# Patient Record
Sex: MALE | Race: AMERICAN INDIAN OR ALASKA NATIVE
[De-identification: names, ages, dates, MRNs, and addresses within clinical notes are randomized per-mention and may not be internally consistent; named-entity substitution may affect disease eponyms.]

---

## 2017-08-13 ENCOUNTER — HOSPITAL ENCOUNTER (INPATIENT)
Dept: HOSPITAL 5 - ED | Age: 32
LOS: 2 days | Discharge: HOME | DRG: 872 | End: 2017-08-15
Attending: INTERNAL MEDICINE | Admitting: INTERNAL MEDICINE
Payer: SELF-PAY

## 2017-08-13 DIAGNOSIS — R73.9: ICD-10-CM

## 2017-08-13 DIAGNOSIS — K52.9: ICD-10-CM

## 2017-08-13 DIAGNOSIS — A41.9: Primary | ICD-10-CM

## 2017-08-13 DIAGNOSIS — Z23: ICD-10-CM

## 2017-08-13 DIAGNOSIS — F17.210: ICD-10-CM

## 2017-08-13 LAB
ALBUMIN SERPL-MCNC: 4.4 G/DL (ref 3.9–5)
ALBUMIN/GLOB SERPL: 1.3 %
ALP SERPL-CCNC: 47 UNITS/L (ref 35–129)
ALT SERPL-CCNC: 18 UNITS/L (ref 7–56)
ANION GAP SERPL CALC-SCNC: 23 MMOL/L
BASOPHILS NFR BLD AUTO: 0.2 % (ref 0–1.8)
BILIRUB DIRECT SERPL-MCNC: 0.2 MG/DL (ref 0–0.2)
BILIRUB INDIRECT SERPL-MCNC: 0.7 MG/DL
BILIRUB SERPL-MCNC: 0.9 MG/DL (ref 0.1–1.2)
BILIRUB UR QL STRIP: (no result)
BLOOD UR QL VISUAL: (no result)
BUN SERPL-MCNC: 15 MG/DL (ref 9–20)
BUN/CREAT SERPL: 12.5 %
CALCIUM SERPL-MCNC: 9.6 MG/DL (ref 8.4–10.2)
CHLORIDE SERPL-SCNC: 90.5 MMOL/L (ref 98–107)
CO2 SERPL-SCNC: 21 MMOL/L (ref 22–30)
EOSINOPHIL NFR BLD AUTO: 0.2 % (ref 0–4.3)
GLUCOSE SERPL-MCNC: 209 MG/DL (ref 75–100)
HCT VFR BLD CALC: 44.3 % (ref 35.5–45.6)
HGB BLD-MCNC: 15.2 GM/DL (ref 11.8–15.2)
INR PPP: 1.03 (ref 0.87–1.13)
KETONES UR STRIP-MCNC: (no result) MG/DL
LEUKOCYTE ESTERASE UR QL STRIP: (no result)
LIPASE SERPL-CCNC: 26 UNITS/L (ref 13–60)
MCH RBC QN AUTO: 31 PG (ref 28–32)
MCHC RBC AUTO-ENTMCNC: 34 % (ref 32–34)
MCV RBC AUTO: 89 FL (ref 84–94)
MUCOUS THREADS #/AREA URNS HPF: (no result) /HPF
NITRITE UR QL STRIP: (no result)
PH UR STRIP: 5 [PH] (ref 5–7)
PLATELET # BLD: 256 K/MM3 (ref 140–440)
POTASSIUM SERPL-SCNC: 3.5 MMOL/L (ref 3.6–5)
PROT SERPL-MCNC: 7.9 G/DL (ref 6.3–8.2)
PROT UR STRIP-MCNC: (no result) MG/DL
RBC # BLD AUTO: 4.98 M/MM3 (ref 3.65–5.03)
RBC #/AREA URNS HPF: 2 /HPF (ref 0–6)
SODIUM SERPL-SCNC: 131 MMOL/L (ref 137–145)
UROBILINOGEN UR-MCNC: < 2 MG/DL (ref ?–2)
WBC # BLD AUTO: 13.6 K/MM3 (ref 4.5–11)
WBC #/AREA URNS HPF: 1 /HPF (ref 0–6)

## 2017-08-13 PROCEDURE — 80048 BASIC METABOLIC PNL TOTAL CA: CPT

## 2017-08-13 PROCEDURE — 85610 PROTHROMBIN TIME: CPT

## 2017-08-13 PROCEDURE — 81001 URINALYSIS AUTO W/SCOPE: CPT

## 2017-08-13 PROCEDURE — 85007 BL SMEAR W/DIFF WBC COUNT: CPT

## 2017-08-13 PROCEDURE — 85025 COMPLETE CBC W/AUTO DIFF WBC: CPT

## 2017-08-13 PROCEDURE — 74177 CT ABD & PELVIS W/CONTRAST: CPT

## 2017-08-13 PROCEDURE — 96366 THER/PROPH/DIAG IV INF ADDON: CPT

## 2017-08-13 PROCEDURE — 87045 FECES CULTURE AEROBIC BACT: CPT

## 2017-08-13 PROCEDURE — 87493 C DIFF AMPLIFIED PROBE: CPT

## 2017-08-13 PROCEDURE — 82962 GLUCOSE BLOOD TEST: CPT

## 2017-08-13 PROCEDURE — 82150 ASSAY OF AMYLASE: CPT

## 2017-08-13 PROCEDURE — 83690 ASSAY OF LIPASE: CPT

## 2017-08-13 PROCEDURE — 82140 ASSAY OF AMMONIA: CPT

## 2017-08-13 PROCEDURE — 80074 ACUTE HEPATITIS PANEL: CPT

## 2017-08-13 PROCEDURE — 82803 BLOOD GASES ANY COMBINATION: CPT

## 2017-08-13 PROCEDURE — 82550 ASSAY OF CK (CPK): CPT

## 2017-08-13 PROCEDURE — 82805 BLOOD GASES W/O2 SATURATION: CPT

## 2017-08-13 PROCEDURE — 86901 BLOOD TYPING SEROLOGIC RH(D): CPT

## 2017-08-13 PROCEDURE — 36415 COLL VENOUS BLD VENIPUNCTURE: CPT

## 2017-08-13 PROCEDURE — 86900 BLOOD TYPING SEROLOGIC ABO: CPT

## 2017-08-13 PROCEDURE — 96365 THER/PROPH/DIAG IV INF INIT: CPT

## 2017-08-13 PROCEDURE — 74176 CT ABD & PELVIS W/O CONTRAST: CPT

## 2017-08-13 PROCEDURE — 36600 WITHDRAWAL OF ARTERIAL BLOOD: CPT

## 2017-08-13 PROCEDURE — 86850 RBC ANTIBODY SCREEN: CPT

## 2017-08-13 PROCEDURE — 99406 BEHAV CHNG SMOKING 3-10 MIN: CPT

## 2017-08-13 PROCEDURE — 96375 TX/PRO/DX INJ NEW DRUG ADDON: CPT

## 2017-08-13 PROCEDURE — 83036 HEMOGLOBIN GLYCOSYLATED A1C: CPT

## 2017-08-13 PROCEDURE — 87040 BLOOD CULTURE FOR BACTERIA: CPT

## 2017-08-13 NOTE — CAT SCAN REPORT
FINAL REPORT



EXAM:  CT ABDOMEN PELVIS W CON



HISTORY:  n,v,fever 



TECHNIQUE:  Serial axial images through the abdomen and pelvis

with coronal and sagittal reconstruction. Intravenous

administration of 100 milliliters Omnipaque 300



PRIORS:  None.



FINDINGS:  

 There is atelectasis in the lung bases. No pleural effusion is

seen. 



The liver, gallbladder, pancreas, spleen and adrenal glands

appear within normal limits. 



Kidneys appear normal. 



Aorta is normal in caliber. 



Bladder is decompressed. 



No free fluid. 



There is not evidence of bowel obstruction. Scattered diverticula

are seen arising from the colon. The appendix is not identified

with certainty. There is diffuse colonic wall thickening with

hyper enhancing mucosa. 



Prominent mesenteric lymph nodes are noted. 



No acute osseous abnormality is identified. 



IMPRESSION:  

1. There is diffuse wall thickening seen associated with the

colon with hyper enhancing mucosa. This is consistent with

colitis.

2. Diverticulosis. 

3. Prominent mesenteric lymph nodes are noted. These may be

reactive. 

4. No free fluid is seen in the abdomen or pelvis.

## 2017-08-13 NOTE — EMERGENCY DEPARTMENT REPORT
ED Fever HPI





- General


Chief Complaint: Fever


Stated Complaint: FEVER/ABD PAIN/VOMITING


Time Seen by Provider: 08/13/17 17:21


Source: patient


Exam Limitations: no limitations





- History of Present Illness


Initial Comments: 





32-year-old male no significant past medical or surgical history presents to 

the hospital complaints nausea, vomiting, diarrhea, and fever 2 days.  Patient 

is able to tolerate liquids but not food.  Denies recent travel, sick contacts, 

dysuria, or cough symptoms.  Mild sore throat several days ago has since 

resolved.  Patient complains of generalized mild to moderate lower back pain 

that is aching.





ED Review of Systems


ROS: 


Stated complaint: FEVER/ABD PAIN/VOMITING


Other details as noted in HPI





Comment: All other systems reviewed and negative


Other: 





Constitutional: as Per HPI


Eyes: No eye pain visual changes or discharge


ENT: No ear pain 


Neck: Denies pain


Respiratory: Denies cough wheezing shortness of breath 


Cardiovascular: Denies chest pain, palpitations, syncope


GI: As per HPI


: Denies dysuria, urinary frequency, or urgency


Musculoskeletal: As per HPI


Skin: Denies rash, lesions, erythema


Neurologic: Denies headache, numbness, weakness


Psychiatric: Denies suicidal ideation, hallucinations








ED Past Medical Hx





- Past Medical History


Previous Medical History?: No





- Surgical History


Past Surgical History?: No





- Social History


Smoking Status: Current Some Day Smoker


Substance Use Type: Alcohol





ED Physical Exam





- General


Limitations: No Limitations





- Other


Other exam information: 





General: No limitations, patient is alert in no acute distress


Head exam: Atraumatic, normocephalic


Eyes exam: Normal appearance, pupils equal reactive to light, extraocular 

movements intact


ENT: Dry mucous membranes, no posterior pharyngeal exudates


Neck exam: Normal inspection, full range of motion, no meningismus nontender


Respiratory exam: Clear to auscultation bilateral, no wheezes, rales, crackles


Cardiovascular: Tachycardic normal rhythm


Abdomen: Soft, nondistended, and  nontender, with normal bowel sounds, no 

rebound, or guarding


Extremity: Full range of motion normal inspection no deformity


Back: Normal Inspection, full range of motion, no tenderness


Neurologic: Alert, oriented x3, cranial nerves intact, no motor or sensory 

deficit


Psychiatric: normal affect, normal mood


Skin: Warm, dry, intact





ED Course


 Vital Signs











  08/13/17 08/13/17 08/13/17





  16:39 17:03 17:10


 


Temperature 103.2 F H  


 


Pulse Rate 125 H 114 H 118 H


 


Respiratory 20 18 18





Rate   


 


Blood Pressure 101/55  106/55


 


O2 Sat by Pulse 100 98 99





Oximetry   














  08/13/17 08/13/17 08/13/17





  17:22 17:30 17:37


 


Temperature   


 


Pulse Rate 115 H 109 H 


 


Respiratory 14 11 L 18





Rate   


 


Blood Pressure 105/48 111/61 


 


O2 Sat by Pulse 99 98 100





Oximetry   














  08/13/17





  19:12


 


Temperature 


 


Pulse Rate 


 


Respiratory 18





Rate 


 


Blood Pressure 


 


O2 Sat by Pulse 





Oximetry 














- Reevaluation(s)


Reevaluation #1: 





08/13/17 20:59


Pt received Toradol, Tylenol, and normal saline and continues to have elevated 

heart rate.  Blood pressure now trending downward despite treatment.  Second 

liter of normal saline progress.  Levaquin and Flagyl initially ordered orally 

but now changed to IV.  Plan to admit patient to the hospital for further 

treatment given unstable vital signs





08/13/17 21:39


Patient also continued to have a elevated temperature additional Tylenol 

ordered.  Given persistent fever over pressure and the fact the appendix was 

not identified.  Patient will be admitted.  CT with oral contrast has been 

ordered and pending at disposition





- Consultations


Consultation #1: 





08/13/17 19:56


Case discussed with Dr. Cadena on call GI doctor suggest temporal and Flagyl 

for colitis.





ED Medical Decision Making





- Lab Data


Result diagrams: 


 08/13/17 16:54





 08/13/17 16:54








 Lab Results











  08/13/17 08/13/17 08/13/17 Range/Units





  16:54 16:54 16:54 


 


WBC   13.6 H   (4.5-11.0)  K/mm3


 


RBC   4.98   (3.65-5.03)  M/mm3


 


Hgb   15.2   (11.8-15.2)  gm/dl


 


Hct   44.3   (35.5-45.6)  %


 


MCV   89   (84-94)  fl


 


MCH   31   (28-32)  pg


 


MCHC   34   (32-34)  %


 


RDW   12.7 L   (13.2-15.2)  %


 


Plt Count   256   (140-440)  K/mm3


 


Lymph % (Auto)   5.0 L   (13.4-35.0)  %


 


Mono % (Auto)   5.6   (0.0-7.3)  %


 


Eos % (Auto)   0.2   (0.0-4.3)  %


 


Baso % (Auto)   0.2   (0.0-1.8)  %


 


Lymph #   0.7 L   (1.2-5.4)  K/mm3


 


Mono #   0.8   (0.0-0.8)  K/mm3


 


Eos #   0.0   (0.0-0.4)  K/mm3


 


Baso #   0.0   (0.0-0.1)  K/mm3


 


Seg Neutrophils %   89.0 H   (40.0-70.0)  %


 


Seg Neutrophils #   12.1 H   (1.8-7.7)  K/mm3


 


PT    13.4  (12.2-14.9)  Sec.


 


INR    1.03  (0.87-1.13)  


 


Sodium     (137-145)  mmol/L


 


Potassium     (3.6-5.0)  mmol/L


 


Chloride     ()  mmol/L


 


Carbon Dioxide     (22-30)  mmol/L


 


Anion Gap     mmol/L


 


BUN     (9-20)  mg/dL


 


Creatinine     (0.8-1.5)  mg/dL


 


Estimated GFR     ml/min


 


BUN/Creatinine Ratio     %


 


Glucose     ()  mg/dL


 


POC Glucose     ()  


 


Hemoglobin A1c     (4-6)  %


 


Lactic Acid     (0.7-2.0)  mmol/L


 


Calcium     (8.4-10.2)  mg/dL


 


Total Bilirubin     (0.1-1.2)  mg/dL


 


Direct Bilirubin     (0-0.2)  mg/dL


 


Indirect Bilirubin     mg/dL


 


AST     (5-40)  units/L


 


ALT     (7-56)  units/L


 


Alkaline Phosphatase     ()  units/L


 


Total Creatine Kinase     ()  units/L


 


Total Protein     (6.3-8.2)  g/dL


 


Albumin     (3.9-5)  g/dL


 


Albumin/Globulin Ratio     %


 


Amylase  49    ()  units/L


 


Lipase     (13-60)  units/L


 


Urine Color     (Yellow)  


 


Urine Turbidity     (Clear)  


 


Urine pH     (5.0-7.0)  


 


Ur Specific Gravity     (1.003-1.030)  


 


Urine Protein     (Negative)  mg/dL


 


Urine Glucose (UA)     (Negative)  mg/dL


 


Urine Ketones     (Negative)  mg/dL


 


Urine Blood     (Negative)  


 


Urine Nitrite     (Negative)  


 


Urine Bilirubin     (Negative)  


 


Urine Urobilinogen     (<2.0)  mg/dL


 


Ur Leukocyte Esterase     (Negative)  


 


Urine WBC (Auto)     (0.0-6.0)  /HPF


 


Urine RBC (Auto)     (0.0-6.0)  /HPF


 


Urine Mucus     /HPF














  08/13/17 08/13/17 08/13/17 Range/Units





  16:54 16:54 16:54 


 


WBC     (4.5-11.0)  K/mm3


 


RBC     (3.65-5.03)  M/mm3


 


Hgb     (11.8-15.2)  gm/dl


 


Hct     (35.5-45.6)  %


 


MCV     (84-94)  fl


 


MCH     (28-32)  pg


 


MCHC     (32-34)  %


 


RDW     (13.2-15.2)  %


 


Plt Count     (140-440)  K/mm3


 


Lymph % (Auto)     (13.4-35.0)  %


 


Mono % (Auto)     (0.0-7.3)  %


 


Eos % (Auto)     (0.0-4.3)  %


 


Baso % (Auto)     (0.0-1.8)  %


 


Lymph #     (1.2-5.4)  K/mm3


 


Mono #     (0.0-0.8)  K/mm3


 


Eos #     (0.0-0.4)  K/mm3


 


Baso #     (0.0-0.1)  K/mm3


 


Seg Neutrophils %     (40.0-70.0)  %


 


Seg Neutrophils #     (1.8-7.7)  K/mm3


 


PT     (12.2-14.9)  Sec.


 


INR     (0.87-1.13)  


 


Sodium  131 L    (137-145)  mmol/L


 


Potassium  3.5 L    (3.6-5.0)  mmol/L


 


Chloride  90.5 L    ()  mmol/L


 


Carbon Dioxide  21 L    (22-30)  mmol/L


 


Anion Gap  23    mmol/L


 


BUN  15    (9-20)  mg/dL


 


Creatinine  1.2    (0.8-1.5)  mg/dL


 


Estimated GFR  > 60    ml/min


 


BUN/Creatinine Ratio  12.50    %


 


Glucose  209 H    ()  mg/dL


 


POC Glucose     ()  


 


Hemoglobin A1c     (4-6)  %


 


Lactic Acid   3.70 H*   (0.7-2.0)  mmol/L


 


Calcium  9.6    (8.4-10.2)  mg/dL


 


Total Bilirubin  0.90    (0.1-1.2)  mg/dL


 


Direct Bilirubin  0.2    (0-0.2)  mg/dL


 


Indirect Bilirubin  0.7    mg/dL


 


AST  22    (5-40)  units/L


 


ALT  18    (7-56)  units/L


 


Alkaline Phosphatase  47    ()  units/L


 


Total Creatine Kinase    85  ()  units/L


 


Total Protein  7.9    (6.3-8.2)  g/dL


 


Albumin  4.4    (3.9-5)  g/dL


 


Albumin/Globulin Ratio  1.3    %


 


Amylase     ()  units/L


 


Lipase  26    (13-60)  units/L


 


Urine Color     (Yellow)  


 


Urine Turbidity     (Clear)  


 


Urine pH     (5.0-7.0)  


 


Ur Specific Gravity     (1.003-1.030)  


 


Urine Protein     (Negative)  mg/dL


 


Urine Glucose (UA)     (Negative)  mg/dL


 


Urine Ketones     (Negative)  mg/dL


 


Urine Blood     (Negative)  


 


Urine Nitrite     (Negative)  


 


Urine Bilirubin     (Negative)  


 


Urine Urobilinogen     (<2.0)  mg/dL


 


Ur Leukocyte Esterase     (Negative)  


 


Urine WBC (Auto)     (0.0-6.0)  /HPF


 


Urine RBC (Auto)     (0.0-6.0)  /HPF


 


Urine Mucus     /HPF














  08/13/17 08/13/17 08/13/17 Range/Units





  16:54 19:39 19:57 


 


WBC     (4.5-11.0)  K/mm3


 


RBC     (3.65-5.03)  M/mm3


 


Hgb     (11.8-15.2)  gm/dl


 


Hct     (35.5-45.6)  %


 


MCV     (84-94)  fl


 


MCH     (28-32)  pg


 


MCHC     (32-34)  %


 


RDW     (13.2-15.2)  %


 


Plt Count     (140-440)  K/mm3


 


Lymph % (Auto)     (13.4-35.0)  %


 


Mono % (Auto)     (0.0-7.3)  %


 


Eos % (Auto)     (0.0-4.3)  %


 


Baso % (Auto)     (0.0-1.8)  %


 


Lymph #     (1.2-5.4)  K/mm3


 


Mono #     (0.0-0.8)  K/mm3


 


Eos #     (0.0-0.4)  K/mm3


 


Baso #     (0.0-0.1)  K/mm3


 


Seg Neutrophils %     (40.0-70.0)  %


 


Seg Neutrophils #     (1.8-7.7)  K/mm3


 


PT     (12.2-14.9)  Sec.


 


INR     (0.87-1.13)  


 


Sodium     (137-145)  mmol/L


 


Potassium     (3.6-5.0)  mmol/L


 


Chloride     ()  mmol/L


 


Carbon Dioxide     (22-30)  mmol/L


 


Anion Gap     mmol/L


 


BUN     (9-20)  mg/dL


 


Creatinine     (0.8-1.5)  mg/dL


 


Estimated GFR     ml/min


 


BUN/Creatinine Ratio     %


 


Glucose     ()  mg/dL


 


POC Glucose   168 H   ()  


 


Hemoglobin A1c  6.2 H    (4-6)  %


 


Lactic Acid     (0.7-2.0)  mmol/L


 


Calcium     (8.4-10.2)  mg/dL


 


Total Bilirubin     (0.1-1.2)  mg/dL


 


Direct Bilirubin     (0-0.2)  mg/dL


 


Indirect Bilirubin     mg/dL


 


AST     (5-40)  units/L


 


ALT     (7-56)  units/L


 


Alkaline Phosphatase     ()  units/L


 


Total Creatine Kinase     ()  units/L


 


Total Protein     (6.3-8.2)  g/dL


 


Albumin     (3.9-5)  g/dL


 


Albumin/Globulin Ratio     %


 


Amylase     ()  units/L


 


Lipase     (13-60)  units/L


 


Urine Color    Yellow  (Yellow)  


 


Urine Turbidity    Clear  (Clear)  


 


Urine pH    5.0  (5.0-7.0)  


 


Ur Specific Gravity    1.043 H  (1.003-1.030)  


 


Urine Protein    <15 mg/dl  (Negative)  mg/dL


 


Urine Glucose (UA)    Neg  (Negative)  mg/dL


 


Urine Ketones    Neg  (Negative)  mg/dL


 


Urine Blood    Sm  (Negative)  


 


Urine Nitrite    Neg  (Negative)  


 


Urine Bilirubin    Neg  (Negative)  


 


Urine Urobilinogen    < 2.0  (<2.0)  mg/dL


 


Ur Leukocyte Esterase    Neg  (Negative)  


 


Urine WBC (Auto)    1.0  (0.0-6.0)  /HPF


 


Urine RBC (Auto)    2.0  (0.0-6.0)  /HPF


 


Urine Mucus    Few  /HPF














- Radiology Data


Radiology results: report reviewed





CT of the pelvis IV contrast only diffuse wall thickening seen associate with 

the colon with hyper enhancing mucosa consistent with colitis.  Diverticulosis.

  Prominent mesenteric lymph nodes are noted.  May be reactive.  No free fluid 

is seen in the abdomen and pelvis. appendix not visualized.


CT abdomen and pelvis by mouth contrast: Similar findings.  Still no mention of 

the appendix





- Medical Decision Making





Plans admit patient to the hospital for persistent tachycardia now patient has 

hypotension despite resuscitation and treatment efforts.  Antibiotics, and IV 

fluids initiated.  Choski will be consulted as an inpatient.





- Differential Diagnosis


UTI, gastroenteritis, appendicitis, diverticulitis


Critical Care Time: No


Critical care attestation.: 


If time is entered above; I have spent that time in minutes in the direct care 

of this critically ill patient, excluding procedure time.








ED Disposition


Clinical Impression: 


 Colitis, Hypotension, Fever





Disposition: [id number] OP ADMIT IP TO THIS HOSP


Is pt being admited?: Yes


Condition: Stable


Time of Disposition: 21:00 (hospitalist)

## 2017-08-14 LAB
ANION GAP SERPL CALC-SCNC: 19 MMOL/L
BASE EXCESS STD BLDA CALC-SCNC: -3 MMOL/L
BUN SERPL-MCNC: 14 MG/DL (ref 9–20)
BUN/CREAT SERPL: 17.5 %
CALCIUM SERPL-MCNC: 7.4 MG/DL (ref 8.4–10.2)
CHLORIDE SERPL-SCNC: 103.9 MMOL/L (ref 98–107)
CO2 SERPL-SCNC: 21 MMOL/L (ref 22–30)
CO2 SERPL-SCNC: 23 MMOL/L
GLUCOSE SERPL-MCNC: 135 MG/DL (ref 75–100)
HCO3 BLDV-SCNC: 21.5 MMOL/L
ISTAT DEVICE: 0
ISTAT PH: 7.4 (ref 7.35–7.45)
PCO2 BLDA: 34.8 MM[HG] (ref 35–45)
PO2 BLDCOA: 86 MM[HG] (ref 80–105)
POTASSIUM SERPL-SCNC: 3.5 MMOL/L (ref 3.6–5)
SAO2 % BLDC: 97 %
SODIUM SERPL-SCNC: 140 MMOL/L (ref 137–145)

## 2017-08-14 PROCEDURE — 3E0234Z INTRODUCTION OF SERUM, TOXOID AND VACCINE INTO MUSCLE, PERCUTANEOUS APPROACH: ICD-10-PCS | Performed by: INTERNAL MEDICINE

## 2017-08-14 RX ADMIN — HYDROMORPHONE HYDROCHLORIDE PRN MG: 1 INJECTION, SOLUTION INTRAMUSCULAR; INTRAVENOUS; SUBCUTANEOUS at 10:12

## 2017-08-14 RX ADMIN — METRONIDAZOLE SCH MLS/HR: 5 INJECTION, SOLUTION INTRAVENOUS at 14:52

## 2017-08-14 RX ADMIN — SODIUM CHLORIDE SCH MLS/HR: 0.9 INJECTION, SOLUTION INTRAVENOUS at 18:32

## 2017-08-14 RX ADMIN — SODIUM CHLORIDE SCH MLS/HR: 0.9 INJECTION, SOLUTION INTRAVENOUS at 01:33

## 2017-08-14 RX ADMIN — LEVOFLOXACIN SCH MLS/HR: 5 INJECTION, SOLUTION INTRAVENOUS at 09:38

## 2017-08-14 RX ADMIN — HYDROMORPHONE HYDROCHLORIDE PRN MG: 1 INJECTION, SOLUTION INTRAMUSCULAR; INTRAVENOUS; SUBCUTANEOUS at 22:41

## 2017-08-14 RX ADMIN — HEPARIN SODIUM SCH UNIT: 5000 INJECTION, SOLUTION INTRAVENOUS; SUBCUTANEOUS at 09:39

## 2017-08-14 RX ADMIN — METRONIDAZOLE SCH: 5 INJECTION, SOLUTION INTRAVENOUS at 14:51

## 2017-08-14 RX ADMIN — METRONIDAZOLE SCH MLS/HR: 5 INJECTION, SOLUTION INTRAVENOUS at 22:32

## 2017-08-14 RX ADMIN — SODIUM CHLORIDE SCH MLS/HR: 0.9 INJECTION, SOLUTION INTRAVENOUS at 08:29

## 2017-08-14 NOTE — HISTORY AND PHYSICAL REPORT
CHIEF COMPLAINT:  Fever, other complaints include abdominal pain, nausea, and

vomiting.



HISTORY OF PRESENT ILLNESS:  The patient is a 32-year-old male with no

significant past medical or surgical history presenting to the Emergency Room

with fever, nausea, vomiting, and diarrhea going on for two days.  There is also

history of right and left lower quadrant abdominal pain.  The patient stated

that he was not able to eat, but was able to tolerate some liquid.  There was no

history of recent antibiotic intake or history of recent travel.  The patient

stated he had some diarrhea some days prior to presentation, but said the 
symptoms

were thought to be resolved.  Denies history of low back pain.  There was no

history of dizziness.



PAST MEDICAL HISTORY:  Unremarkable.



PAST SURGICAL HISTORY:  Unremarkable.



FAMILY HISTORY:  Noncontributory.



SOCIAL HISTORY:  The patient drinks alcohol, smokes cigarettes, and does not use

illicit drugs.



MEDICATIONS:  The patient is not on any medication.



ALLERGIES:  There are no known drug allergies.



REVIEW OF SYSTEMS:

CONSTITUTIONAL:  Denies fever, but no chills, no diaphoresis.

HEENT:  There is no headache or sore throat.

CARDIOVASCULAR SYSTEM:  There is no chest pain, orthopnea.

RESPIRATORY SYSTEM:  There is no shortness of breath or cough.

GASTROINTESTINAL SYSTEM:  Abdominal discomfort noted, nausea, vomiting, and

diarrhea noted.

NEUROLOGICAL SYSTEM:  There is no numbness, no dizziness, no altered mental

status.

MUSCULOSKELETAL SYSTEM:  There is no joint pain or swelling.

DERMATOLOGICAL SYSTEM:  There is no skin rash or itching.

GENITOURINARY SYSTEM:  There is no dysuria, hematuria, or flank pain.

Rest of system review is normal.



PHYSICAL EXAMINATION:

GENERAL:  At the time of exam, the patient was found to be alert, oriented x 3,

and not in acute distress.

VITAL SIGNS:  Shows temperature of 98.6 degrees Fahrenheit, pulse of 109,

respirations 11, blood pressure 111/61, and O2 sat of 98% on room air.

HEENT EXAM:  Showed pupils to be equal, round, reactive to light and

accommodation.  Extraocular muscles are intact.

NECK:  Supple with no JVD or carotid bruit.

CARDIOVASCULAR SYSTEM:  Show first and second heart sounds with no gallops or

rubs.

RESPIRATORY SYSTEM:  Showed good air entry on both sides of the lung with no

abnormal breath sounds.

GASTROINTESTINAL SYSTEM:  Show abdomen to be full, soft, and nontender with no

organomegaly or rigidity.

NEUROLOGIC:  Showed no focal deficit.

MUSCULOSKELETAL SYSTEM:  Show no joint swelling or tenderness.

DERMATOLOGICAL SYSTEM:  Show no skin rash.

GENITOURINARY SYSTEM:  Showing no costovertebral angle tenderness.



PERTINENT LABORATORY DATA AND IMAGING STUDIES:  The patient had a CT of the

abdomen with contrast that showed diffuse wall thickening seen associated with

colon along with enhancing mucosa that is consistent with colitis.  There is

also finding of diverticulosis and finding of prominent mesenteric lymph nodes. 

No free fluid was seen in the abdomen or pelvis.  The patient also has no

evidence of bowel obstruction on CT scan.



LABORATORY RESULTS:  The patient had CBC done that shows elevated white count of

13,600 with normal hemoglobin and normal hematocrit as well as normal platelets.

 CBC differential shows elevated segmented neutrophils of 89% and coagulation

test was normal.  Chemistry shows low sodium of 131 with low potassium of 3.5

and low chloride of 90.5 with low CO2 of 21.  The patient's glucose level was

high with a value of 209 with elevated hemoglobin A1c of 6.2.  Lactic acid level

was high with a value of 3.7, repeat lactic acid level came back to normal.  The

patient's urinalysis showed elevated specific gravity of 1.043 with negative

nitrites and negative urine leukocyte esterase and normal urine wbc with no

bacteria seen.



DIAGNOSES:

1.  Colitis.

2.  Hyperglycemia,( elevated blood glucose).



PLAN:  The patient will be admitted to medical floor and will continue

gastroenterology consult with Dr. Surinder Orozco.  The patient will be on IV

normal saline at 150 mL an hour and will be on IV Dilaudid 1 mg every 4 hours as

needed for pain and will be on IV Levaquin 750 mg daily.  The patient will also

be on IV metronidazole 500 mg q.8 hours and will be on IV Zofran 4 mg every six

hours as needed for nausea and vomiting and will be on Tylenol 650 mg by mouth

every 6 hours for fever and headache.  Deep venous thrombosis prophylaxis will

be through sequential compressive devices.  For the management of patient's

colitis will be dependent on the gastroenterology consult.





DD: 08/14/2017 05:24

DT: 08/14/2017 11:19

JOB# [id number]  [id number]

OCN/NTS

MTDD

## 2017-08-14 NOTE — CAT SCAN REPORT
FINAL REPORT



EXAM:  CT ABDOMEN PELVIS WO CON



HISTORY:  persitant fever, abd pain, colitis 



TECHNIQUE:  Serial axial images through the abdomen and pelvis

with coronal and sagittal reconstruction.  Oral contrast was

administered prior to the exam 



PRIORS:  CT abdomen pelvis from 08/13/2017



FINDINGS:  

  There is mild atelectasis in the dependent portion the lung

bases. No pleural effusion is seen. 



The liver, gallbladder, pancreas, spleen and adrenal glands

appear within normal limits. 



No gross abnormality is seen in the kidneys. Contrast media is

seen in the renal collecting systems and bladder. No gross

abnormality is seen in the bladder. 



Aorta is normal in caliber. 



No free fluid. 



There is diffuse wall thickening in the colon, as in the prior

study. There is not evidence of bowel obstruction. Scattered

diverticula are seen arising the colon. 



Mildly prominent mesenteric lymph nodes are noted. 



IMPRESSION:  

1. Abnormal appearance to the colon is again noted, consistent

with diffuse colitis.

2. There is not evidence of bowel obstruction at this time. 

3. No free fluid is seen in the abdomen or pelvis. 

4. Diverticulosis. 

5. Prominent mesenteric lymph nodes are again noted. These may be

reactive.

## 2017-08-14 NOTE — PROGRESS NOTE
Assessment and Plan


Assessment and plan: 





Sepsis.  Continue sepsis pathway.  Patient meets criteria given the fever, 

tachycardia, leukocytosis and diagnosis of colitis.  Follow-up lactic acid 

levels and blood cultures.





Acute colitis.  GI consultation pending.  Continue IV antibiotics.  Follow-up 

stool studies.





History


Interval history: 





Patient complains of intermittent diffuse abdominal pain.  However, pain 

primarily in the lower quadrants.





Hospitalist Physical





- Constitutional


Vitals: 


 











Temp Pulse Resp BP Pulse Ox


 


 98.1 F   98 H  16   92/62   98 


 


 08/14/17 08:00  08/14/17 08:00  08/14/17 08:00  08/14/17 08:00  08/14/17 08:00











General appearance: Present: no acute distress, well-nourished





- EENT


Eyes: Present: PERRL, EOM intact


ENT: hearing intact, clear oral mucosa, dentition normal





- Neck


Neck: Present: supple, normal ROM





- Respiratory


Respiratory effort: normal


Respiratory: bilateral: CTA





- Cardiovascular


Rhythm: regular


Heart Sounds: Present: S1 & S2.  Absent: gallop, rub





- Extremities


Extremities: no ischemia, No edema, Full ROM





- Abdominal


General gastrointestinal: soft, non-tender, tender, non-distended, normal bowel 

sounds


Localized gastrointestinal: tender: RLQ (mild), LLQ (mild)





- Integumentary


Integumentary: Present: clear, warm, dry





- Neurologic


Neurologic: CNII-XII intact, moves all extremities





Results





- Labs


CBC & Chem 7: 


 08/13/17 16:54





 08/14/17 07:12


Labs: 


 Laboratory Last Values











WBC  13.6 K/mm3 (4.5-11.0)  H  08/13/17  16:54    


 


RBC  4.98 M/mm3 (3.65-5.03)   08/13/17  16:54    


 


Hgb  15.2 gm/dl (11.8-15.2)   08/13/17  16:54    


 


Hct  44.3 % (35.5-45.6)   08/13/17  16:54    


 


MCV  89 fl (84-94)   08/13/17  16:54    


 


MCH  31 pg (28-32)   08/13/17  16:54    


 


MCHC  34 % (32-34)   08/13/17  16:54    


 


RDW  12.7 % (13.2-15.2)  L  08/13/17  16:54    


 


Plt Count  256 K/mm3 (140-440)   08/13/17  16:54    


 


Lymph % (Auto)  5.0 % (13.4-35.0)  L  08/13/17  16:54    


 


Mono % (Auto)  5.6 % (0.0-7.3)   08/13/17  16:54    


 


Eos % (Auto)  0.2 % (0.0-4.3)   08/13/17  16:54    


 


Baso % (Auto)  0.2 % (0.0-1.8)   08/13/17  16:54    


 


Lymph #  0.7 K/mm3 (1.2-5.4)  L  08/13/17  16:54    


 


Mono #  0.8 K/mm3 (0.0-0.8)   08/13/17  16:54    


 


Eos #  0.0 K/mm3 (0.0-0.4)   08/13/17  16:54    


 


Baso #  0.0 K/mm3 (0.0-0.1)   08/13/17  16:54    


 


Seg Neutrophils %  89.0 % (40.0-70.0)  H  08/13/17  16:54    


 


Seg Neutrophils #  12.1 K/mm3 (1.8-7.7)  H  08/13/17  16:54    


 


PT  13.4 Sec. (12.2-14.9)   08/13/17  16:54    


 


INR  1.03  (0.87-1.13)   08/13/17  16:54    


 


VBG pH  7.426  (7.320-7.420)  H  08/13/17  21:00    


 


Sodium  140 mmol/L (137-145)  D 08/14/17  07:12    


 


Potassium  3.5 mmol/L (3.6-5.0)  L  08/14/17  07:12    


 


Chloride  103.9 mmol/L ()   08/14/17  07:12    


 


Carbon Dioxide  21 mmol/L (22-30)  L  08/14/17  07:12    


 


Anion Gap  19 mmol/L  08/14/17  07:12    


 


BUN  14 mg/dL (9-20)   08/14/17  07:12    


 


Creatinine  0.8 mg/dL (0.8-1.5)   08/14/17  07:12    


 


Estimated GFR  > 60 ml/min  08/14/17  07:12    


 


BUN/Creatinine Ratio  17.50 %  08/14/17  07:12    


 


Glucose  135 mg/dL ()  H  08/14/17  07:12    


 


POC Glucose  168  ()  H  08/13/17  19:39    


 


Hemoglobin A1c  6.2 % (4-6)  H  08/13/17  16:54    


 


Lactic Acid  1.60 mmol/L (0.7-2.0)   08/13/17  21:00    


 


Calcium  7.4 mg/dL (8.4-10.2)  L D 08/14/17  07:12    


 


Total Bilirubin  0.90 mg/dL (0.1-1.2)   08/13/17  16:54    


 


Direct Bilirubin  0.2 mg/dL (0-0.2)   08/13/17  16:54    


 


Indirect Bilirubin  0.7 mg/dL  08/13/17  16:54    


 


AST  22 units/L (5-40)   08/13/17  16:54    


 


ALT  18 units/L (7-56)   08/13/17  16:54    


 


Alkaline Phosphatase  47 units/L ()   08/13/17  16:54    


 


Total Creatine Kinase  85 units/L ()   08/13/17  16:54    


 


Total Protein  7.9 g/dL (6.3-8.2)   08/13/17  16:54    


 


Albumin  4.4 g/dL (3.9-5)   08/13/17  16:54    


 


Albumin/Globulin Ratio  1.3 %  08/13/17  16:54    


 


Amylase  49 units/L ()   08/13/17  16:54    


 


Lipase  26 units/L (13-60)   08/13/17  16:54    


 


Urine Color  Yellow  (Yellow)   08/13/17  19:57    


 


Urine Turbidity  Clear  (Clear)   08/13/17  19:57    


 


Urine pH  5.0  (5.0-7.0)   08/13/17  19:57    


 


Ur Specific Gravity  1.043  (1.003-1.030)  H  08/13/17  19:57    


 


Urine Protein  <15 mg/dl mg/dL (Negative)   08/13/17  19:57    


 


Urine Glucose (UA)  Neg mg/dL (Negative)   08/13/17  19:57    


 


Urine Ketones  Neg mg/dL (Negative)   08/13/17  19:57    


 


Urine Blood  Sm  (Negative)   08/13/17  19:57    


 


Urine Nitrite  Neg  (Negative)   08/13/17  19:57    


 


Urine Bilirubin  Neg  (Negative)   08/13/17  19:57    


 


Urine Urobilinogen  < 2.0 mg/dL (<2.0)   08/13/17  19:57    


 


Ur Leukocyte Esterase  Neg  (Negative)   08/13/17  19:57    


 


Urine WBC (Auto)  1.0 /HPF (0.0-6.0)   08/13/17  19:57    


 


Urine RBC (Auto)  2.0 /HPF (0.0-6.0)   08/13/17  19:57    


 


Urine Mucus  Few /HPF  08/13/17  19:57

## 2017-08-14 NOTE — GASTROENTEROLOGY CONSULTATION
<ILSA WARE - Last Filed: 08/14/17 18:22>





History of Present Illness





- Reason for Consult


Consult date: 08/14/17


colitis


Requesting physician: JULITA DIALLO





- History of Present Illness


Patient is a 31 y/o male who presented to the ER with c/o fever, N/V, and 

diarrhea. He reports eating raw oysters on Thursday 08/10/17 and then developed 

symptoms the following day. No sick contacts, recent travel, or recent abx use. 

CT scan revealed colitis. This afternoon, pt was resting in bed with no acute 

distress noted. He states his N/V has now resolved and diarrhea is improving. 

Denies fever, abd pain, N/V, hematemesis, melena, constipation or hematochezia. 

No hx or Fhx of IBD. Reports daily tobacco and alcohol use. No significant 

prior medical history.








Past History


Past Medical History: No medical history


Past Surgical History: No surgical history


Social history: smoking, other (lives with a roommate)


Family history: no significant family history





Medications and Allergies


 Allergies











Allergy/AdvReac Type Severity Reaction Status Date / Time


 


No Known Allergies Allergy   Verified 08/13/17 16:41











Active Meds: 


Active Medications





Acetaminophen (Tylenol)  650 mg PO Q6H PRN


   PRN Reason: For Pain/Fever/Headache


Heparin Sodium (Porcine) (Heparin)  5,000 unit SUB-Q Q12HR [facility]


   Last Admin: 08/14/17 09:39 Dose:  5,000 unit


Hydromorphone HCl (Dilaudid)  1 mg IV Q4H PRN


   PRN Reason: Pain , Severe (7-10)


   Last Admin: 08/14/17 10:12 Dose:  1 mg


Levofloxacin/Dextrose (Levaquin 750mg/150ml)  750 mg in 150 mls @ 100 mls/hr IV 

Q24HR [facility]


   PRN Reason: Protocol


   Last Admin: 08/14/17 09:38 Dose:  100 mls/hr


Metronidazole (Flagyl 500 Mg/100 Ml)  500 mg in 100 mls @ 100 mls/hr IV Q8HR [facility]


   Last Admin: 08/14/17 14:52 Dose:  100 mls/hr


Sodium Chloride (Nacl 0.9% 1000 Ml)  1,000 mls @ 150 mls/hr IV AS DIRECT [facility]


   Last Admin: 08/14/17 08:29 Dose:  150 mls/hr


Ondansetron HCl (Zofran)  4 mg IV Q6H PRN


   PRN Reason: Nausea And Vomiting











Review of Systems





- Review of Systems


All systems: negative


Constitutional: fever


Gastrointestinal: nausea, vomiting, diarrhea





Exam





- Constitutional


Vital Signs: 


 











Temp Pulse Resp BP Pulse Ox


 


 98.2 F   94 H  18   102/64   98 


 


 08/14/17 15:39  08/14/17 15:39  08/14/17 15:39  08/14/17 15:39  08/14/17 08:00











General appearance: no acute distress, well-nourished





- EENT


Eyes: PERRL, EOM intact


ENT: hearing intact





- Neck


Neck: supple, normal ROM





- Respiratory


Respiratory: bilateral: CTA





- Cardiovascular


Rhythm: regular


Heart Sounds: Present: S1 & S2


Extremities: No edema





- Gastrointestinal


General gastrointestinal: Present: soft, tender (generalized), non-distended, 

normal bowel sounds





- Integumentary


Integumentary: Present: warm, dry





- Neurologic


Neurological: alert and oriented x3





- Psychiatric


Psychiatric: appropriate mood/affect, cooperative





- Labs


CBC & Chem 7: 


 08/13/17 16:54





 08/14/17 07:12


Lab Results: 


 Laboratory Results - last 24 hr











  08/14/17 08/14/17 08/14/17





  07:12 10:03 10:13


 


POC ABG pH   


 


POC ABG pCO2   


 


POC ABG pO2   


 


POC ABG HCO3   


 


POC ABG Total CO2   


 


POC ABG O2 Sat   


 


POC ABG Base Excess   


 


FiO2   


 


Sodium  140  D  


 


Potassium  3.5 L  


 


Chloride  103.9  


 


Carbon Dioxide  21 L  


 


Anion Gap  19  


 


BUN  14  


 


Creatinine  0.8  


 


Estimated GFR  > 60  


 


BUN/Creatinine Ratio  17.50  


 


Glucose  135 H  


 


Lactic Acid   1.80 


 


Calcium  7.4 L D  


 


Blood Type    A POSITIVE


 


Antibody Screen    Negative














  08/14/17





  14:55


 


POC ABG pH  7.399


 


POC ABG pCO2  34.8 L


 


POC ABG pO2  86


 


POC ABG HCO3  21.5


 


POC ABG Total CO2  23


 


POC ABG O2 Sat  97


 


POC ABG Base Excess  -3


 


FiO2  21


 


Sodium 


 


Potassium 


 


Chloride 


 


Carbon Dioxide 


 


Anion Gap 


 


BUN 


 


Creatinine 


 


Estimated GFR 


 


BUN/Creatinine Ratio 


 


Glucose 


 


Lactic Acid 


 


Calcium 


 


Blood Type 


 


Antibody Screen 














Assessment and Plan


1.colitis





-WBC 13.6 yesterday


-afebrile


-stool negative for c-diff


-stool culture and WBC- pending


-CT scan revealed colitis


-etiology inflammatory vs infectious (most likely due to ingestion of raw 

oysters)


-N/V and diarrhea now improving


-continue flagyl and levaquin


-continue supportive care


-will follow








<TREVIN CALVILLO - Last Filed: 08/15/17 13:42>





Medications and Allergies


Active Meds: 


Active Medications





Acetaminophen (Tylenol)  650 mg PO Q6H PRN


   PRN Reason: For Pain/Fever/Headache


Heparin Sodium (Porcine) (Heparin)  5,000 unit SUB-Q Q12HR [facility]


   Last Admin: 08/15/17 09:50 Dose:  5,000 unit


Hydromorphone HCl (Dilaudid)  1 mg IV Q4H PRN


   PRN Reason: Pain , Severe (7-10)


   Last Admin: 08/15/17 09:54 Dose:  1 mg


Levofloxacin/Dextrose (Levaquin 750mg/150ml)  750 mg in 150 mls @ 100 mls/hr IV 

Q24HR [facility]


   PRN Reason: Protocol


   Last Admin: 08/15/17 09:49 Dose:  100 mls/hr


Metronidazole (Flagyl 500 Mg/100 Ml)  500 mg in 100 mls @ 100 mls/hr IV Q8HR [facility]


   Last Admin: 08/15/17 06:19 Dose:  100 mls/hr


Sodium Chloride (Nacl 0.9% 1000 Ml)  1,000 mls @ 150 mls/hr IV AS DIRECT [facility]


   Last Admin: 08/15/17 09:48 Dose:  150 mls/hr


Ondansetron HCl (Zofran)  4 mg IV Q6H PRN


   PRN Reason: Nausea And Vomiting


   Last Admin: 08/14/17 22:41 Dose:  4 mg











Exam





- Constitutional


Vital Signs: 


 











Temp Pulse Resp BP Pulse Ox


 


 98.6 F   90   19   116/76   98 


 


 08/15/17 08:00  08/15/17 08:00  08/15/17 08:00  08/15/17 08:00  08/15/17 08:00














- Labs


CBC & Chem 7: 


 08/15/17 08:05





 08/14/17 07:12


Lab Results: 


 Laboratory Results - last 24 hr











  08/14/17 08/15/17





  14:55 08:05


 


WBC   14.5 H


 


RBC   3.98


 


Hgb   12.2  D


 


Hct   35.5  D


 


MCV   89


 


MCH   31


 


MCHC   35 H


 


RDW   12.7 L


 


Plt Count   202


 


Lymph % (Auto)   11.4 L


 


Mono % (Auto)   8.0 H


 


Eos % (Auto)   1.1


 


Baso % (Auto)   0.3


 


Lymph #   1.7


 


Mono #   1.2 H


 


Eos #   0.2


 


Baso #   0.0


 


Seg Neutrophils %   79.2 H


 


Seg Neutrophils #   11.5 H


 


POC ABG pH  7.399 


 


POC ABG pCO2  34.8 L 


 


POC ABG pO2  86 


 


POC ABG HCO3  21.5 


 


POC ABG Total CO2  23 


 


POC ABG O2 Sat  97 


 


POC ABG Base Excess  -3 


 


FiO2  21 














Assessment and Plan


Given hx of oyster ingestion, acute infectious etiology with Vibrio or other 

organism is likely.

## 2017-08-15 VITALS — DIASTOLIC BLOOD PRESSURE: 87 MMHG | SYSTOLIC BLOOD PRESSURE: 127 MMHG

## 2017-08-15 LAB
BASOPHILS NFR BLD AUTO: 0.3 % (ref 0–1.8)
EOSINOPHIL NFR BLD AUTO: 1.1 % (ref 0–4.3)
HCT VFR BLD CALC: 35.5 % (ref 35.5–45.6)
HGB BLD-MCNC: 12.2 GM/DL (ref 11.8–15.2)
MCH RBC QN AUTO: 31 PG (ref 28–32)
MCHC RBC AUTO-ENTMCNC: 35 % (ref 32–34)
MCV RBC AUTO: 89 FL (ref 84–94)
PLATELET # BLD: 202 K/MM3 (ref 140–440)
RBC # BLD AUTO: 3.98 M/MM3 (ref 3.65–5.03)
WBC # BLD AUTO: 14.5 K/MM3 (ref 4.5–11)

## 2017-08-15 RX ADMIN — METRONIDAZOLE SCH MLS/HR: 5 INJECTION, SOLUTION INTRAVENOUS at 06:19

## 2017-08-15 RX ADMIN — SODIUM CHLORIDE SCH MLS/HR: 0.9 INJECTION, SOLUTION INTRAVENOUS at 03:30

## 2017-08-15 RX ADMIN — HEPARIN SODIUM SCH UNIT: 5000 INJECTION, SOLUTION INTRAVENOUS; SUBCUTANEOUS at 09:50

## 2017-08-15 RX ADMIN — METRONIDAZOLE SCH MLS/HR: 5 INJECTION, SOLUTION INTRAVENOUS at 15:17

## 2017-08-15 RX ADMIN — HYDROMORPHONE HYDROCHLORIDE PRN MG: 1 INJECTION, SOLUTION INTRAMUSCULAR; INTRAVENOUS; SUBCUTANEOUS at 09:54

## 2017-08-15 RX ADMIN — SODIUM CHLORIDE SCH MLS/HR: 0.9 INJECTION, SOLUTION INTRAVENOUS at 09:48

## 2017-08-15 RX ADMIN — LEVOFLOXACIN SCH MLS/HR: 5 INJECTION, SOLUTION INTRAVENOUS at 09:49

## 2017-08-15 NOTE — DISCHARGE SUMMARY
Providers





- Providers


Date of Admission: 


08/13/17 22:16





Date of discharge: 08/15/17


Attending physician: 


ODELL GUZMÁN





Primary care physician: 


PRIMARY CARE MD








Hospitalization


Reason for admission: abd pain


Condition: Stable


Hospital course: 





Patient is a 31 y/o male with no significant prior medical history who 

presented to the ER with c/o fever, N/V, and diarrhea. He reported eating raw 

oysters on Thursday 08/10/17 and then developed symptoms the following day.  No 

sick contacts, recent travel, or recent abx use.  CT scan revealed colitis.  

Patient was admitted to the hospital with diagnosis of sepsis and acute colitis 

and received IV fluid hydration, antiemetics and supportive care.  His N/V and 

diarrhea resolved.  Patient was seen by GI in consultation.  Patient received 

IV antibiotics of Levaquin and Flagyl.  Etiology was felt to be inflammatory 

versus infectious most likely due to ingestion of oysters.  Stool was found to 

be positive for fecal leukocytes and C. difficile was negative.  Stool cultures 

pending and will be followed up as an outpatient.  Patient will follow-up with 

GI as an outpatient.  Patient's diet was advanced which he was able to 

tolerate.  Patient is felt to have received maximal hospital benefit.  Therefore

, patient will be discharged home.  Dedicated discharge time 35 minutes.


Disposition: DC-01 TO HOME OR SELFCARE


Time spent for discharge: 35





- Discharge Diagnoses


(1) Colitis


Status: Acute   





(2) Sepsis


Status: Acute   


Qualifiers: 


   Sepsis type: S 





Core Measure Documentation





- Palliative Care


Palliative Care/ Comfort Measures: Not Applicable





- Core Measures


Any of the following diagnoses?: none





Exam





- Constitutional


Vitals: 


 











Temp Pulse Resp BP Pulse Ox


 


 98.6 F   90   19   116/76   98 


 


 08/15/17 08:00  08/15/17 08:00  08/15/17 08:00  08/15/17 08:00  08/15/17 08:00











General appearance: Present: no acute distress, well-nourished





- EENT


Eyes: Present: PERRL


ENT: hearing intact, clear oral mucosa





- Neck


Neck: Present: supple, normal ROM





- Respiratory


Respiratory effort: normal


Respiratory: bilateral: CTA





- Cardiovascular


Heart Sounds: Present: S1 & S2.  Absent: rub, click





- Extremities


Extremities: pulses symmetrical, No edema


Peripheral Pulses: within normal limits





- Abdominal


General gastrointestinal: Present: soft, non-tender, non-distended, normal 

bowel sounds


Male genitourinary: Present: normal





- Integumentary


Integumentary: Present: clear, warm, dry





- Musculoskeletal


Musculoskeletal: gait normal, strength equal bilaterally





- Psychiatric


Psychiatric: appropriate mood/affect, intact judgment & insight





- Neurologic


Neurologic: CNII-XII intact, moves all extremities





Plan


Activity: no restrictions


Weight Bearing Status: Full Weight Bearing


Diet: regular


Follow up with: 


PRIMARY CARE,MD [Primary Care Provider] - 3-5 Days


JOANN GRIFFIN MD [Staff Physician] - 7 Days


Prescriptions: 


Levofloxacin [Levaquin TAB] 500 mg PO QDAY #14 tablet


metroNIDAZOLE [Flagyl CAP] 500 mg PO Q8H #42 capsule


oxyCODONE /ACETAMINOPHEN [Percocet 5/325] 1 tab PO Q4HR #30 tab

## 2017-08-15 NOTE — GASTROENTEROLOGY PROGRESS NOTE
<JEANIEILSA JOVANA - Last Filed: 08/15/17 09:43>





Assessment and Plan


1.colitis





-afebrile


-stool negative for c-diff


-CT scan revealed colitis


-stool culture pending


-etiology inflammatory vs infectious (most likely due to ingestion of raw 

oysters)


-N/V resolved


-diarrhea improving


-continue flagyl and levaquin


-continue supportive care


-advance diet as tolerated


-no further recommendations, pt is ok to be d/c per GI standpoint on abx 

therapy with a f/u appt in 3 to 4 weeks


-discussed need for f/u appt with pt, understanding voiced, office information 

and card given


-will sign off








Subjective


Date of service: 08/15/17


Principal diagnosis: colitis


Interval history: 


Patient resting in bed. No acute distress. Denies N/V and abd pain. Diarrhea 

improving with only 1 BM this morning. 








Objective





- Constitutional


Vitals: 


 











Temp Pulse Resp BP Pulse Ox


 


 98.6 F   90   19   116/76   98 


 


 08/15/17 08:00  08/15/17 08:00  08/15/17 08:00  08/15/17 08:00  08/15/17 08:00











General appearance: no acute distress, well-nourished





- EENT


Eyes: PERRL, EOM intact


ENT: hearing intact





- Neck


Neck: supple, normal ROM





- Respiratory


Respiratory: bilateral: CTA





- Cardiovascular


Rhythm: regular


Heart Sounds: Present: S1 & S2





- Extremities


Extremities: No edema





- Gastrointestinal


General gastrointestinal: Present: soft, tender (mildly tender-generalized), non

-distended, normal bowel sounds





- Integumentary


Integumentary: Present: warm, dry





- Neurologic


Neurological: alert and oriented x3





- Psychiatric


Psychiatric: appropriate mood/affect, cooperative





- Labs


CBC & Chem 7: 


 08/15/17 08:05





 08/14/17 07:12


Labs: 


 Laboratory Results - last 24 hr











  08/14/17 08/14/17 08/14/17





  10:03 10:13 14:55


 


WBC   


 


RBC   


 


Hgb   


 


Hct   


 


MCV   


 


MCH   


 


MCHC   


 


RDW   


 


Plt Count   


 


Lymph % (Auto)   


 


Mono % (Auto)   


 


Eos % (Auto)   


 


Baso % (Auto)   


 


Lymph #   


 


Mono #   


 


Eos #   


 


Baso #   


 


Seg Neutrophils %   


 


Seg Neutrophils #   


 


POC ABG pH    7.399


 


POC ABG pCO2    34.8 L


 


POC ABG pO2    86


 


POC ABG HCO3    21.5


 


POC ABG Total CO2    23


 


POC ABG O2 Sat    97


 


POC ABG Base Excess    -3


 


FiO2    21


 


Lactic Acid  1.80  


 


Blood Type   A POSITIVE 


 


Antibody Screen   Negative 














  08/15/17





  08:05


 


WBC  14.5 H


 


RBC  3.98


 


Hgb  12.2  D


 


Hct  35.5  D


 


MCV  89


 


MCH  31


 


MCHC  35 H


 


RDW  12.7 L


 


Plt Count  202


 


Lymph % (Auto)  11.4 L


 


Mono % (Auto)  8.0 H


 


Eos % (Auto)  1.1


 


Baso % (Auto)  0.3


 


Lymph #  1.7


 


Mono #  1.2 H


 


Eos #  0.2


 


Baso #  0.0


 


Seg Neutrophils %  79.2 H


 


Seg Neutrophils #  11.5 H


 


POC ABG pH 


 


POC ABG pCO2 


 


POC ABG pO2 


 


POC ABG HCO3 


 


POC ABG Total CO2 


 


POC ABG O2 Sat 


 


POC ABG Base Excess 


 


FiO2 


 


Lactic Acid 


 


Blood Type 


 


Antibody Screen 














<TREVIN CALVILLO R - Last Filed: 08/15/17 14:45>





Assessment and Plan


Pt feels great.  No prior hx of diarrhea.  Wants to f/u with us to ensure he is 

well.


Okay for Discharge from my standpoint.








Objective





- Constitutional


Vitals: 


 











Temp Pulse Resp BP Pulse Ox


 


 98.6 F   90   19   116/76   98 


 


 08/15/17 08:00  08/15/17 08:00  08/15/17 08:00  08/15/17 08:00  08/15/17 08:00














- Labs


CBC & Chem 7: 


 08/15/17 08:05





 08/14/17 07:12


Labs: 


 Laboratory Results - last 24 hr











  08/14/17 08/15/17





  14:55 08:05


 


WBC   14.5 H


 


RBC   3.98


 


Hgb   12.2  D


 


Hct   35.5  D


 


MCV   89


 


MCH   31


 


MCHC   35 H


 


RDW   12.7 L


 


Plt Count   202


 


Lymph % (Auto)   11.4 L


 


Mono % (Auto)   8.0 H


 


Eos % (Auto)   1.1


 


Baso % (Auto)   0.3


 


Lymph #   1.7


 


Mono #   1.2 H


 


Eos #   0.2


 


Baso #   0.0


 


Seg Neutrophils %   79.2 H


 


Seg Neutrophils #   11.5 H


 


POC ABG pH  7.399 


 


POC ABG pCO2  34.8 L 


 


POC ABG pO2  86 


 


POC ABG HCO3  21.5 


 


POC ABG Total CO2  23 


 


POC ABG O2 Sat  97 


 


POC ABG Base Excess  -3 


 


FiO2  21

## 2022-06-05 ENCOUNTER — HOSPITAL ENCOUNTER (EMERGENCY)
Dept: HOSPITAL 5 - ED | Age: 37
Discharge: HOME | End: 2022-06-05
Payer: SELF-PAY

## 2022-06-05 VITALS — SYSTOLIC BLOOD PRESSURE: 123 MMHG | DIASTOLIC BLOOD PRESSURE: 81 MMHG
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
## 2022-06-05 DIAGNOSIS — Z79.899: ICD-10-CM
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
## 2022-06-05 DIAGNOSIS — E11.9: ICD-10-CM

## 2022-06-05 DIAGNOSIS — S46.912A: Primary | ICD-10-CM

## 2022-06-05 DIAGNOSIS — Z20.2: ICD-10-CM
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
## 2022-06-05 PROCEDURE — 99282 EMERGENCY DEPT VISIT SF MDM: CPT
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
